# Patient Record
Sex: MALE | Race: BLACK OR AFRICAN AMERICAN | NOT HISPANIC OR LATINO | Employment: FULL TIME | URBAN - METROPOLITAN AREA
[De-identification: names, ages, dates, MRNs, and addresses within clinical notes are randomized per-mention and may not be internally consistent; named-entity substitution may affect disease eponyms.]

---

## 2018-05-07 ENCOUNTER — HOSPITAL ENCOUNTER (EMERGENCY)
Facility: HOSPITAL | Age: 18
Discharge: HOME | End: 2018-05-07
Attending: EMERGENCY MEDICINE
Payer: MEDICAID

## 2018-05-07 VITALS
TEMPERATURE: 98 F | WEIGHT: 140 LBS | SYSTOLIC BLOOD PRESSURE: 152 MMHG | RESPIRATION RATE: 18 BRPM | DIASTOLIC BLOOD PRESSURE: 86 MMHG | OXYGEN SATURATION: 100 % | BODY MASS INDEX: 21.97 KG/M2 | HEART RATE: 110 BPM | HEIGHT: 67 IN

## 2018-05-07 DIAGNOSIS — R06.02 SHORTNESS OF BREATH: ICD-10-CM

## 2018-05-07 DIAGNOSIS — F41.9 ANXIETY: Primary | ICD-10-CM

## 2018-05-07 PROCEDURE — 93005 ELECTROCARDIOGRAM TRACING: CPT

## 2018-05-07 PROCEDURE — 99284 EMERGENCY DEPT VISIT MOD MDM: CPT

## 2018-05-07 PROCEDURE — 25000003 PHARM REV CODE 250: Performed by: NURSE PRACTITIONER

## 2018-05-07 PROCEDURE — 93010 ELECTROCARDIOGRAM REPORT: CPT | Mod: ,,, | Performed by: INTERNAL MEDICINE

## 2018-05-07 RX ORDER — LORAZEPAM 1 MG/1
0.5 TABLET ORAL EVERY 8 HOURS PRN
Qty: 15 TABLET | Refills: 0 | Status: SHIPPED | OUTPATIENT
Start: 2018-05-07

## 2018-05-07 RX ORDER — LORAZEPAM 0.5 MG/1
1 TABLET ORAL
Status: COMPLETED | OUTPATIENT
Start: 2018-05-07 | End: 2018-05-07

## 2018-05-07 RX ADMIN — LORAZEPAM 1 MG: 0.5 TABLET ORAL at 08:05

## 2018-05-08 NOTE — ED PROVIDER NOTES
Encounter Date: 5/7/2018       History     Chief Complaint   Patient presents with    Shortness of Breath     pt presents with c/o SOB x1 day; denies Hx of similar event; denies Hx of asthma or anxiety     The history is provided by the patient. No  was used.   Shortness of Breath   This is a new problem. Duration: Patient presents ambulatory complaining of several episodes of shortness of breath and palpitations beginning this morning that woke him up out of his sleep.  Patient denies history of anxiety or respiratory/cardiac problems. Pertinent negatives include no fever, no headaches, no coryza, no rhinorrhea, no sore throat, no swollen glands, no ear pain, no neck pain, no cough, no sputum production, no hemoptysis, no wheezing, no PND, no orthopnea, no chest pain, no syncope, no vomiting, no abdominal pain, no rash, no leg pain, no leg swelling and no claudication. Precipitated by: Stress. He has tried nothing for the symptoms. Associated medical issues do not include asthma, COPD, pneumonia, chronic lung disease, PE, CAD, heart failure, past MI, DVT or recent surgery.     Review of patient's allergies indicates:   Allergen Reactions    Shrimp Hives     History reviewed. No pertinent past medical history.  Past Surgical History:   Procedure Laterality Date    CIRCUMCISION       History reviewed. No pertinent family history.  Social History   Substance Use Topics    Smoking status: Never Smoker    Smokeless tobacco: Not on file    Alcohol use No     Review of Systems   Constitutional: Negative.  Negative for chills and fever.   HENT: Negative.  Negative for congestion, ear pain, rhinorrhea, sinus pressure and sore throat.    Eyes: Negative.  Negative for pain and discharge.   Respiratory: Positive for shortness of breath. Negative for cough, hemoptysis, sputum production and wheezing.    Cardiovascular: Negative.  Negative for chest pain, orthopnea, claudication, leg swelling, syncope  and PND.   Gastrointestinal: Negative.  Negative for abdominal pain, diarrhea, nausea and vomiting.   Genitourinary: Negative.  Negative for dysuria, genital sores, penile pain, scrotal swelling and testicular pain.   Musculoskeletal: Negative.  Negative for gait problem, joint swelling and neck pain.   Skin: Negative.  Negative for color change and rash.   Allergic/Immunologic: Negative.    Neurological: Negative.  Negative for headaches.   Psychiatric/Behavioral: Negative for behavioral problems, self-injury and suicidal ideas. The patient is not hyperactive.         Stressed/anxious   All other systems reviewed and are negative.      Physical Exam     Initial Vitals [05/07/18 2007]   BP Pulse Resp Temp SpO2   (!) 152/86 110 18 97.9 °F (36.6 °C) 100 %      MAP       108         Physical Exam    Nursing note and vitals reviewed.  Constitutional: He appears well-developed and well-nourished. He is not diaphoretic.  Non-toxic appearance. He does not appear ill. No distress.   HENT:   Head: Normocephalic and atraumatic.   Eyes: Conjunctivae are normal.   Neck: Normal range of motion.   Cardiovascular: Normal rate, regular rhythm, normal heart sounds and intact distal pulses. Exam reveals no gallop and no friction rub.    No murmur heard.  Pulmonary/Chest: Breath sounds normal. No respiratory distress. He has no wheezes. He has no rhonchi. He has no rales. He exhibits no tenderness.   Musculoskeletal: Normal range of motion.   Neurological: He is alert and oriented to person, place, and time.   Skin: Skin is warm and dry. Capillary refill takes less than 2 seconds. No rash noted.   Psychiatric: His behavior is normal. Judgment and thought content normal. His mood appears anxious. His affect is not angry, not blunt, not labile and not inappropriate. His speech is not rapid and/or pressured, not delayed, not tangential and not slurred. He is not agitated, not aggressive, not hyperactive, not slowed, not withdrawn and not  combative. Thought content is not paranoid and not delusional. Cognition and memory are normal. He does not exhibit a depressed mood. He expresses no homicidal and no suicidal ideation. He expresses no suicidal plans and no homicidal plans. He is communicative.   Upon psychiatric assessment, the patient states that he feels an increased sense of stress related to feelings of loneliness and depression.  Patient states that although he knows that his parents both left him, he feels as though they are also concerned with the labs that they sometimes forget about him and his feelings and do not pay attention to him which causes him to feel as if he is all alone.  Patient is a college student at [employer] and reports that he is doing well in school in his grades have not suffered as a result of his feelings.  Patient denies any suicidal ideations or thoughts.         ED Course   Procedures  Labs Reviewed - No data to display  EKG Readings: (Independently Interpreted)   Initial Reading: No STEMI. Rhythm: Normal Sinus Rhythm. Heart Rate: 68. Ectopy: No Ectopy. Conduction: Normal. ST Segments: Normal ST Segments. T Waves: Normal. Axis: Normal. Clinical Impression: Normal Sinus Rhythm     Imaging Results          X-Ray Chest PA And Lateral (Final result)  Result time 05/07/18 20:37:46    Final result by Glenn Zapata MD (05/07/18 20:37:46)                 Impression:      No acute cardiopulmonary finding.      Electronically signed by: Glenn Zapata MD  Date:    05/07/2018  Time:    20:37             Narrative:    EXAMINATION:  XR CHEST PA AND LATERAL    CLINICAL HISTORY:  Shortness of breath    TECHNIQUE:  Frontal and lateral views of the chest were performed.    COMPARISON:  None.    FINDINGS:  Cardiac silhouette is not enlarged. No focal consolidation.  No sizable pleural effusion.  No pneumothorax.  No acute bony abnormality.                                   Medical Decision Making:   Initial  Assessment:   Anxiety  Differential Diagnosis:   Suicidal ideations, cardiac abnormality, respiratory abnormality/shortness of breath  Clinical Tests:   Radiological Study: Ordered and Reviewed  ED Management:  The patient will be given Ativan p.o. in the ER and discharged home on p.r.n. Ativan.  Patient is instructed to follow up with his primary care provider for further evaluation and referral for counseling services.  The patient is instructed to return to the ER as needed if symptoms worsen or fail to improve.  The patient and his mother verbalized understanding of discharge instructions and treatment plan.                      Clinical Impression:   The primary encounter diagnosis was Anxiety. A diagnosis of Shortness of breath was also pertinent to this visit.                           Toussaint Battley III, [facility]  05/07/18 3562

## 2018-05-08 NOTE — ED PROVIDER NOTES
Encounter Date: 5/7/2018       History     Chief Complaint   Patient presents with    Shortness of Breath     pt presents with c/o SOB x1 day; denies Hx of similar event; denies Hx of asthma or anxiety     HPI  Review of patient's allergies indicates:   Allergen Reactions    Shrimp Hives     History reviewed. No pertinent past medical history.  Past Surgical History:   Procedure Laterality Date    CIRCUMCISION       History reviewed. No pertinent family history.  Social History   Substance Use Topics    Smoking status: Never Smoker    Smokeless tobacco: Not on file    Alcohol use No     Review of Systems    Physical Exam     Initial Vitals [05/07/18 2007]   BP Pulse Resp Temp SpO2   (!) 152/86 110 18 97.9 °F (36.6 °C) 100 %      MAP       108         Physical Exam    ED Course   Procedures  Labs Reviewed - No data to display  EKG Readings: (Independently Interpreted)   Rhythm: Normal Sinus Rhythm. Heart Rate: 68. Ectopy: No Ectopy. Conduction: Normal. ST Segments: Normal ST Segments. T Waves: Normal. Clinical Impression: Normal Sinus Rhythm                               Clinical Impression:   The primary encounter diagnosis was Anxiety. A diagnosis of Shortness of breath was also pertinent to this visit.                           Jian Fortune MD  05/11/18 2028       Jian Fortune MD  05/11/18 2029

## 2020-10-19 ENCOUNTER — HOSPITAL ENCOUNTER (EMERGENCY)
Facility: HOSPITAL | Age: 20
Discharge: HOME | End: 2020-10-19
Attending: INTERNAL MEDICINE
Payer: MEDICAID

## 2020-10-19 VITALS
WEIGHT: 149 LBS | SYSTOLIC BLOOD PRESSURE: 142 MMHG | TEMPERATURE: 98 F | RESPIRATION RATE: 18 BRPM | HEART RATE: 60 BPM | DIASTOLIC BLOOD PRESSURE: 73 MMHG | HEIGHT: 67 IN | BODY MASS INDEX: 23.39 KG/M2 | OXYGEN SATURATION: 100 %

## 2020-10-19 DIAGNOSIS — Z20.2 EXPOSURE TO CHLAMYDIA: Primary | ICD-10-CM

## 2020-10-19 PROCEDURE — 63600175 PHARM REV CODE 636 W HCPCS: Mod: ER | Performed by: INTERNAL MEDICINE

## 2020-10-19 PROCEDURE — 87491 CHLMYD TRACH DNA AMP PROBE: CPT

## 2020-10-19 PROCEDURE — 96372 THER/PROPH/DIAG INJ SC/IM: CPT | Mod: ER

## 2020-10-19 PROCEDURE — 25000003 PHARM REV CODE 250: Mod: ER | Performed by: INTERNAL MEDICINE

## 2020-10-19 PROCEDURE — 63700000 PHARM REV CODE 250 ALT 637 W/O HCPCS: Mod: ER | Performed by: INTERNAL MEDICINE

## 2020-10-19 PROCEDURE — 99284 EMERGENCY DEPT VISIT MOD MDM: CPT | Mod: 25,ER

## 2020-10-19 RX ORDER — LIDOCAINE HYDROCHLORIDE 10 MG/ML
5 INJECTION INFILTRATION; PERINEURAL
Status: COMPLETED | OUTPATIENT
Start: 2020-10-19 | End: 2020-10-19

## 2020-10-19 RX ORDER — AZITHROMYCIN 250 MG/1
1000 TABLET, FILM COATED ORAL
Status: COMPLETED | OUTPATIENT
Start: 2020-10-19 | End: 2020-10-19

## 2020-10-19 RX ORDER — CEFTRIAXONE 250 MG/1
250 INJECTION, POWDER, FOR SOLUTION INTRAMUSCULAR; INTRAVENOUS
Status: COMPLETED | OUTPATIENT
Start: 2020-10-19 | End: 2020-10-19

## 2020-10-19 RX ADMIN — AZITHROMYCIN MONOHYDRATE 1000 MG: 250 TABLET ORAL at 03:10

## 2020-10-19 RX ADMIN — LIDOCAINE HYDROCHLORIDE 2 ML: 10 INJECTION, SOLUTION INFILTRATION; PERINEURAL at 03:10

## 2020-10-19 RX ADMIN — CEFTRIAXONE SODIUM 250 MG: 250 INJECTION, POWDER, FOR SOLUTION INTRAMUSCULAR; INTRAVENOUS at 03:10

## 2020-10-19 NOTE — ED PROVIDER NOTES
Encounter Date: 10/19/2020       History     Chief Complaint   Patient presents with    EXPOSURE TO STD     PT REPORTS PARTNER DX WITH CHLAMYDIA. PT REQUESTING STD CHECK, DENIES ANY SYMPTOMS     20-year-old male presents to the emergency department complaining of exposure chlamydia.  States his sexual partner recently tested positive for chlamydia.  He denies penile discharge/fever/chills/nausea/shortness of breath/chest pain.    The history is provided by the patient. No  was used.     Review of patient's allergies indicates:   Allergen Reactions    Shrimp Hives     History reviewed. No pertinent past medical history.  Past Surgical History:   Procedure Laterality Date    CIRCUMCISION       History reviewed. No pertinent family history.  Social History     Tobacco Use    Smoking status: Never Smoker    Smokeless tobacco: Never Used   Substance Use Topics    Alcohol use: Yes     Comment: RARELY    Drug use: No     Review of Systems   Constitutional: Negative for chills and fever.   Respiratory: Negative for cough.    Cardiovascular: Negative for chest pain.   Genitourinary: Negative for discharge.   All other systems reviewed and are negative.      Physical Exam     Initial Vitals [10/19/20 0246]   BP Pulse Resp Temp SpO2   (!) 142/73 60 18 98.1 °F (36.7 °C) 100 %      MAP       --         Physical Exam    Nursing note and vitals reviewed.  Constitutional: He appears well-developed and well-nourished. No distress.   HENT:   Head: Normocephalic and atraumatic.   Right Ear: External ear normal.   Left Ear: External ear normal.   Eyes: Conjunctivae are normal.   Neck: Normal range of motion. Neck supple.   Cardiovascular: Normal rate and regular rhythm.   Pulmonary/Chest: Breath sounds normal. No respiratory distress.   Abdominal: Soft. Bowel sounds are normal.   Genitourinary:    Testes and penis normal.   Circumcised. No phimosis, paraphimosis, hypospadias, penile erythema or penile  tenderness. No discharge found.   Musculoskeletal: Normal range of motion.   Neurological: He is alert. He has normal strength.   Skin: Skin is warm and dry. Capillary refill takes less than 2 seconds.   Psychiatric: He has a normal mood and affect. Thought content normal.         ED Course   Procedures  Labs Reviewed   C. TRACHOMATIS/N. GONORRHOEAE BY AMP DNA          Imaging Results    None          Medical Decision Making:   Initial Assessment:   20-year-old male presents to the emergency department complaining of exposure chlamydia.  States his sexual partner recently tested positive for chlamydia.  He denies penile discharge/fever/chills/nausea/shortness of breath/chest pain.  ED Management:  Urine was sent for GC/chlamydia screen.  Patient was given Zithromax/Rocephin in the emergency department and received instructions for chlamydia exposure.  He was advised to follow up with his primary care physician within the next week for re-evaluation/return to the emergency department if condition worsens.                             Clinical Impression:     ICD-10-CM ICD-9-CM   1. Exposure to chlamydia  Z20.2 V01.6                          ED Disposition Condition    Discharge Stable        ED Prescriptions     None        Follow-up Information     Follow up With Specialties Details Why Contact Info    BRIAN Worley III, MD Orthopedic Surgery Schedule an appointment as soon as possible for a visit in 1 day For reevaluation [address] [id number] [phone]                                         Jose R Kwong MD  10/19/20 8449

## 2020-11-14 ENCOUNTER — HOSPITAL ENCOUNTER (EMERGENCY)
Facility: HOSPITAL | Age: 20
Discharge: HOME | End: 2020-11-14
Attending: EMERGENCY MEDICINE
Payer: MEDICAID

## 2020-11-14 VITALS
TEMPERATURE: 98 F | OXYGEN SATURATION: 100 % | HEART RATE: 62 BPM | DIASTOLIC BLOOD PRESSURE: 72 MMHG | SYSTOLIC BLOOD PRESSURE: 121 MMHG | RESPIRATION RATE: 18 BRPM

## 2020-11-14 DIAGNOSIS — K59.00 CONSTIPATION, UNSPECIFIED CONSTIPATION TYPE: ICD-10-CM

## 2020-11-14 DIAGNOSIS — K60.2 ANAL FISSURE: Primary | ICD-10-CM

## 2020-11-14 LAB
BILIRUBIN, POC UA: NEGATIVE
BLOOD, POC UA: NEGATIVE
CLARITY, POC UA: CLEAR
COLOR, POC UA: YELLOW
GLUCOSE, POC UA: NEGATIVE
KETONES, POC UA: NEGATIVE
LEUKOCYTE EST, POC UA: NEGATIVE
NITRITE, POC UA: NEGATIVE
PH UR STRIP: 7 [PH]
PROTEIN, POC UA: NEGATIVE
SPECIFIC GRAVITY, POC UA: >=1.03
UROBILINOGEN, POC UA: 1 E.U./DL

## 2020-11-14 PROCEDURE — 81003 URINALYSIS AUTO W/O SCOPE: CPT | Mod: ER

## 2020-11-14 PROCEDURE — 99284 EMERGENCY DEPT VISIT MOD MDM: CPT | Mod: ER

## 2020-11-14 RX ORDER — HYDROCORTISONE ACETATE PRAMOXINE HCL 2.5; 1 G/100G; G/100G
CREAM TOPICAL 3 TIMES DAILY
Qty: 30 G | Refills: 0 | Status: SHIPPED | OUTPATIENT
Start: 2020-11-14 | End: 2020-11-24

## 2020-11-14 RX ORDER — HYDROCORTISONE 25 MG/G
CREAM TOPICAL
Status: DISCONTINUED | OUTPATIENT
Start: 2020-11-14 | End: 2020-11-14 | Stop reason: HOSPADM

## 2020-11-14 RX ORDER — DOCUSATE SODIUM 100 MG/1
100 CAPSULE, LIQUID FILLED ORAL 2 TIMES DAILY
Qty: 20 CAPSULE | Refills: 0 | Status: SHIPPED | OUTPATIENT
Start: 2020-11-14 | End: 2020-11-24

## 2020-11-14 RX ORDER — HYDROXYZINE PAMOATE 50 MG/1
50 CAPSULE ORAL EVERY 6 HOURS PRN
Qty: 20 CAPSULE | Refills: 0 | Status: SHIPPED | OUTPATIENT
Start: 2020-11-14 | End: 2020-11-19

## 2020-11-14 NOTE — ED PROVIDER NOTES
"Encounter Date: 11/14/2020    SCRIBE #1 NOTE: I, Afia Soto, am scribing for, and in the presence of,  SUNDAY Burns. I have scribed the following portions of the note - Other sections scribed: HPI, ROS, PE.       History     Chief Complaint   Patient presents with    Hemorrhoids     Pt states," I have an issue with my butt. I have anal itching and after I pee it agosto."     Forrest Elizabeth is a 20 y.o. male who presents to the ED complaining of intermittent anal itching and discomfort with urination after being treated for exposure to chlamydia on 10/19/20. Reports that he is usually constipated, with only 2 bowel movements per week. Anal itching occurs after having bowel movements and lasts for 5-10 minutes following.  Denies fever, chills, nausea, vomiting, diarrhea, rectal pain, rectal bleeding, abdominal pain, dysuria, hematuria, frequency, urgency, penile discharge, or testicular pain.    The history is provided by the patient. No  was used.     Review of patient's allergies indicates:   Allergen Reactions    Shrimp Hives     History reviewed. No pertinent past medical history.  Past Surgical History:   Procedure Laterality Date    CIRCUMCISION       History reviewed. No pertinent family history.  Social History     Tobacco Use    Smoking status: Never Smoker    Smokeless tobacco: Never Used   Substance Use Topics    Alcohol use: Yes     Comment: RARELY    Drug use: No     Review of Systems   Constitutional: Negative for chills and fever.   HENT: Negative for sore throat.    Eyes: Negative for redness.   Respiratory: Negative for shortness of breath.    Cardiovascular: Negative for chest pain.   Gastrointestinal: Positive for constipation. Negative for abdominal pain, anal bleeding, diarrhea, nausea, rectal pain and vomiting.        + Anal itching   Genitourinary: Negative for difficulty urinating, discharge, dysuria, frequency, hematuria, testicular pain and urgency.        + " discomfort with urination   Musculoskeletal: Negative for back pain.   Skin: Negative for rash.       Physical Exam     Initial Vitals [11/14/20 1050]   BP Pulse Resp Temp SpO2   137/77 70 16 98 °F (36.7 °C) 99 %      MAP       --         Physical Exam    Nursing note and vitals reviewed.  Constitutional: He appears well-developed and well-nourished.   HENT:   Head: Normocephalic and atraumatic.   Eyes: Conjunctivae are normal.   Neck: Normal range of motion. Neck supple.   Cardiovascular: Normal rate and intact distal pulses.   Pulmonary/Chest: No respiratory distress.   Abdominal: Soft. There is no abdominal tenderness.   Genitourinary: Rectum:      No tenderness or external hemorrhoid.      Genitourinary Comments: Chaperoned by Afia Soto scribe:   No perirectal swelling or erythema. Small anal fissure at 6 oclock. No purulent drainage        Musculoskeletal: Normal range of motion.   Neurological: He is alert and oriented to person, place, and time.   Skin: Skin is warm and dry.   Psychiatric: He has a normal mood and affect. His behavior is normal.         ED Course   Procedures  Labs Reviewed   POCT URINALYSIS W/O SCOPE - Abnormal; Notable for the following components:       Result Value    Spec Grav UA >=1.030 (*)     All other components within normal limits   POCT URINALYSIS W/O SCOPE          Imaging Results    None          Medical Decision Making:   History:   Old Medical Records: I decided to obtain old medical records.  Clinical Tests:   Lab Tests: Ordered and Reviewed  ED Management:  20-year-old male presenting for intermittent anal itching that occurs after bowel movements.  He reports he does have chronic constipation.  Only 2 bowel movements per week.  Symptoms occur after having a bowel movement.  Denies any fever, chills, drainage, rectal bleeding or pain.  Exam above.  Patient is afebrile, nontoxic appearing distress.  No external hemorrhoid.  No evidence of perirectal or perianal abscess.   Think this is likely due to anal fissure.  Will discharge with hydrocortisone cream and barrier cream as well as Colace stool softener.  Instructed on high-fiber diet and foods and things to avoid to prevent itching.  He states he does use scented wipes which she has been using for while.  Instructed to discontinue use in case there is irritation from use. He reports having discomfort with urination. UA is negative for infection. No penile discharge, pain or swelling or testicular pain or swelling. Will have him follow up with PCP. Already treated for GC last month. FOllow up with primary care in 2 days and colorectal surgery. Return to ER for worsening symptoms or as needed.             Scribe Attestation:   Scribe #1: I performed the above scribed service and the documentation accurately describes the services I performed. I attest to the accuracy of the note.    Attending Attestation:           Physician Attestation for Scribe:  Physician Attestation Statement for Scribe #1: I, Sirena Villagran PA-C, reviewed documentation, as scribed by Afia Soto  in my presence, and it is both accurate and complete.                            Clinical Impression:     ICD-10-CM ICD-9-CM   1. Anal fissure  K60.2 565.0   2. Constipation, unspecified constipation type  K59.00 564.00                          ED Disposition Condition    Discharge Stable        ED Prescriptions     Medication Sig Dispense Start Date End Date Auth. Provider    hydrocortisone-pramoxine (ANALPRAM-HC) 2.5-1 % Crea Place rectally 3 (three) times daily. for 10 days 30 g 11/14/2020 11/24/2020 Sirena Villagran PA-C    zinc oxide 10 % Crea Apply 50 g topically as needed. 50 g 11/14/2020 11/28/2020 Sirena Villagran PA-C    docusate sodium (COLACE) 100 MG capsule Take 1 capsule (100 mg total) by mouth 2 (two) times daily. for 10 days 20 capsule 11/14/2020 11/24/2020 Sirena Villagran PA-C    hydrOXYzine pamoate (VISTARIL) 50 MG Cap Take 1 capsule  (50 mg total) by mouth every 6 (six) hours as needed (itching). 20 capsule 11/14/2020 11/19/2020 Sirena Villagran PA-C        Follow-up Information     Follow up With Specialties Details Why Contact Info    [facility]  Schedule an appointment as soon as possible for a visit   [address]  [address]  [place] [address]  [phone]      [facility]  Schedule an appointment as soon as possible for a visit   [address]  [place] [facility] [address]  [phone]      [facility] COLON & RECTAL SURGERY Colon and Rectal Surgery   [address] [address]  [phone]    [facility]  Schedule an appointment as soon as possible for a visit in 2 days for follow up Please call [phone] to schedule follow up with [facility].    Approved Provider By Your Insurance Company  Schedule an appointment as soon as possible for a visit in 2 days Call number on your insurance card to receive a full list of providers in your area     [facility] Emergency Medicine Go to  As needed, If symptoms worsen [address] [facility] [address] [phone]                                       Sirena Villagran PA-C  11/14/20 1850

## 2020-12-13 ENCOUNTER — HOSPITAL ENCOUNTER (EMERGENCY)
Facility: HOSPITAL | Age: 20
Discharge: HOME | End: 2020-12-13
Attending: INTERNAL MEDICINE
Payer: MEDICAID

## 2020-12-13 VITALS
DIASTOLIC BLOOD PRESSURE: 85 MMHG | SYSTOLIC BLOOD PRESSURE: 124 MMHG | HEIGHT: 68 IN | RESPIRATION RATE: 18 BRPM | WEIGHT: 140 LBS | BODY MASS INDEX: 21.22 KG/M2 | HEART RATE: 76 BPM | TEMPERATURE: 98 F | OXYGEN SATURATION: 99 %

## 2020-12-13 DIAGNOSIS — Z20.2 POTENTIAL EXPOSURE TO STD: Primary | ICD-10-CM

## 2020-12-13 PROCEDURE — 99282 EMERGENCY DEPT VISIT SF MDM: CPT | Mod: ER

## 2020-12-13 NOTE — DISCHARGE INSTRUCTIONS
Follow-up with [facility] within the next 3 days  Address: [address], [place] [address]   Hours: Closed ? Opens 8AM Wed   Phone: [phone]

## 2020-12-13 NOTE — ED PROVIDER NOTES
Encounter Date: 12/13/2020    SCRIBE #1 NOTE: I, Seema Gonzalez, am scribing for, and in the presence of,  Dr. Kwong. I have scribed the following portions of the note - Other sections scribed: HPI, ROS, PE.       History     Chief Complaint   Patient presents with    EXPOSURE TO STD     PT REPORTS PARTNER REPORTED POSSIBLE CHLAMYDIA, PT DINIES ANY SYMPTOMS BUT REQUESTING TX     Forrest Elizabeth is a 20 y.o. male who presents to the ED with concern of possible exposure to chlamydia. The patient's female sexual partner tested positive for chlamydia. Both she and the patient were subsequently treated for chlamydia, but the partner thinks that she still has it, so the patient is also requesting treatment again. Patient denies any previous or present symptoms.    The history is provided by the patient. No  was used.     Review of patient's allergies indicates:   Allergen Reactions    Shrimp Hives     History reviewed. No pertinent past medical history.  Past Surgical History:   Procedure Laterality Date    CIRCUMCISION       History reviewed. No pertinent family history.  Social History     Tobacco Use    Smoking status: Never Smoker    Smokeless tobacco: Never Used   Substance Use Topics    Alcohol use: Yes     Comment: RARELY    Drug use: No     Review of Systems   Constitutional: Negative for fever.   HENT: Negative for sore throat.    Respiratory: Negative for shortness of breath.    Cardiovascular: Negative for chest pain.   Gastrointestinal: Negative for diarrhea, nausea and vomiting.   Genitourinary: Negative for dysuria.   Musculoskeletal: Negative for back pain.   Skin: Negative for rash.   Neurological: Negative for weakness and headaches.   Psychiatric/Behavioral: Negative for behavioral problems.   All other systems reviewed and are negative.      Physical Exam     Initial Vitals [12/13/20 0015]   BP Pulse Resp Temp SpO2   124/85 76 18 98.2 °F (36.8 °C) 99 %      MAP       --          Physical Exam    Nursing note and vitals reviewed.  Constitutional: He appears well-developed and well-nourished.   HENT:   Head: Normocephalic and atraumatic.   Eyes: Conjunctivae are normal.   Neck: Normal range of motion. Neck supple.   Cardiovascular: Normal rate, regular rhythm and normal heart sounds. Exam reveals no gallop and no friction rub.    No murmur heard.  Pulmonary/Chest: Breath sounds normal. No respiratory distress. He has no wheezes. He has no rhonchi. He has no rales.   Abdominal: Soft. There is no abdominal tenderness.   Musculoskeletal: Normal range of motion. No edema.   Neurological: He is alert and oriented to person, place, and time. GCS score is 15. GCS eye subscore is 4. GCS verbal subscore is 5. GCS motor subscore is 6.   Skin: Skin is warm and dry.   Psychiatric: He has a normal mood and affect.         ED Course   Procedures  Labs Reviewed - No data to display       Imaging Results    None          Medical Decision Making:   History:   Old Medical Records: I decided to obtain old medical records.  Initial Assessment:   Forrest Elizabeth is a 20 y.o. male who presents to the ED with concern of possible exposure to chlamydia. The patient's female sexual partner tested positive for chlamydia. Both she and the patient were subsequently treated for chlamydia, but the partner thinks that she still has it, so the patient is also requesting treatment again. Patient denies any previous or present symptoms.  ED Management:  Patient exhibits no symptoms of sexually transmitted disease and no evidence of contact with a chlamydia infected sexual partner.  He was advised follow up with the [facility] for routine screening of sexually transmitted diseases.            Scribe Attestation:   Scribe #1: I performed the above scribed service and the documentation accurately describes the services I performed. I attest to the accuracy of the note.    This document was produced by a zayibe  under my direction and in my presence. I agree with the content of the note and have made any necessary edits.     Dr. Kwong    12/13/2020 2:53 AM                    Clinical Impression:     ICD-10-CM ICD-9-CM   1. Potential exposure to STD  Z20.2 V01.6                          ED Disposition Condition    Discharge Stable        ED Prescriptions     None        Follow-up Information    None                                      Jose R Kwong MD  12/13/20 0254

## 2021-01-20 ENCOUNTER — HOSPITAL ENCOUNTER (EMERGENCY)
Facility: HOSPITAL | Age: 21
Discharge: HOME | End: 2021-01-21
Attending: EMERGENCY MEDICINE
Payer: MEDICAID

## 2021-01-20 VITALS
OXYGEN SATURATION: 99 % | BODY MASS INDEX: 21.98 KG/M2 | TEMPERATURE: 99 F | RESPIRATION RATE: 20 BRPM | WEIGHT: 145 LBS | HEIGHT: 68 IN | SYSTOLIC BLOOD PRESSURE: 131 MMHG | HEART RATE: 99 BPM | DIASTOLIC BLOOD PRESSURE: 86 MMHG

## 2021-01-20 DIAGNOSIS — B34.9 VIRAL ILLNESS: Primary | ICD-10-CM

## 2021-01-20 PROCEDURE — 99282 EMERGENCY DEPT VISIT SF MDM: CPT | Mod: 25,ER

## 2021-01-21 LAB — POC RAPID STREP A: NEGATIVE

## 2021-04-15 ENCOUNTER — PATIENT MESSAGE (OUTPATIENT)
Dept: RESEARCH | Facility: HOSPITAL | Age: 21
End: 2021-04-15

## 2021-11-25 ENCOUNTER — HOSPITAL ENCOUNTER (EMERGENCY)
Facility: HOSPITAL | Age: 21
Discharge: HOME | End: 2021-11-25
Attending: EMERGENCY MEDICINE
Payer: MEDICAID

## 2021-11-25 VITALS
TEMPERATURE: 98 F | RESPIRATION RATE: 16 BRPM | HEART RATE: 74 BPM | DIASTOLIC BLOOD PRESSURE: 64 MMHG | OXYGEN SATURATION: 98 % | BODY MASS INDEX: 21.22 KG/M2 | HEIGHT: 68 IN | WEIGHT: 140 LBS | SYSTOLIC BLOOD PRESSURE: 118 MMHG

## 2021-11-25 DIAGNOSIS — M25.561 RIGHT KNEE PAIN: ICD-10-CM

## 2021-11-25 PROCEDURE — 99284 EMERGENCY DEPT VISIT MOD MDM: CPT | Mod: ER

## 2021-11-25 RX ORDER — LIDOCAINE HYDROCHLORIDE AND EPINEPHRINE 10; 10 MG/ML; UG/ML
1 INJECTION, SOLUTION INFILTRATION; PERINEURAL
Status: DISCONTINUED | OUTPATIENT
Start: 2021-11-25 | End: 2021-11-25

## 2021-11-25 RX ORDER — KETOROLAC TROMETHAMINE 10 MG/1
10 TABLET, FILM COATED ORAL EVERY 6 HOURS PRN
Qty: 12 TABLET | Refills: 0 | Status: SHIPPED | OUTPATIENT
Start: 2021-11-25 | End: 2021-11-28

## 2021-11-25 RX ORDER — ONDANSETRON 2 MG/ML
8 INJECTION INTRAMUSCULAR; INTRAVENOUS
Status: DISCONTINUED | OUTPATIENT
Start: 2021-11-25 | End: 2021-11-25

## 2021-11-25 RX ORDER — DICLOFENAC SODIUM 10 MG/G
GEL TOPICAL
Qty: 100 G | Refills: 0 | Status: SHIPPED | OUTPATIENT
Start: 2021-11-25

## 2024-02-06 ENCOUNTER — HOSPITAL ENCOUNTER (EMERGENCY)
Facility: HOSPITAL | Age: 24
Discharge: HOME | End: 2024-02-06
Attending: EMERGENCY MEDICINE
Payer: MEDICAID

## 2024-02-06 VITALS
SYSTOLIC BLOOD PRESSURE: 140 MMHG | HEART RATE: 75 BPM | HEIGHT: 68 IN | TEMPERATURE: 98 F | DIASTOLIC BLOOD PRESSURE: 85 MMHG | BODY MASS INDEX: 21.98 KG/M2 | WEIGHT: 145 LBS | RESPIRATION RATE: 19 BRPM | OXYGEN SATURATION: 100 %

## 2024-02-06 DIAGNOSIS — R30.0 DYSURIA: Primary | ICD-10-CM

## 2024-02-06 DIAGNOSIS — Z20.2 POSSIBLE EXPOSURE TO STD: ICD-10-CM

## 2024-02-06 LAB
BILIRUBIN, POC UA: NEGATIVE
BLOOD, POC UA: NEGATIVE
CLARITY, POC UA: CLEAR
COLOR, POC UA: YELLOW
GLUCOSE, POC UA: NEGATIVE
KETONES, POC UA: NEGATIVE
LEUKOCYTE EST, POC UA: NEGATIVE
NITRITE, POC UA: NEGATIVE
PH UR STRIP: 7 [PH]
PROTEIN, POC UA: NEGATIVE
SPECIFIC GRAVITY, POC UA: 1.02
UROBILINOGEN, POC UA: 1 E.U./DL

## 2024-02-06 PROCEDURE — 63600175 PHARM REV CODE 636 W HCPCS: Mod: ER | Performed by: EMERGENCY MEDICINE

## 2024-02-06 PROCEDURE — 87491 CHLMYD TRACH DNA AMP PROBE: CPT | Performed by: EMERGENCY MEDICINE

## 2024-02-06 PROCEDURE — 99284 EMERGENCY DEPT VISIT MOD MDM: CPT | Mod: 25,ER

## 2024-02-06 PROCEDURE — 96372 THER/PROPH/DIAG INJ SC/IM: CPT | Performed by: EMERGENCY MEDICINE

## 2024-02-06 RX ORDER — CEFTRIAXONE 500 MG/1
500 INJECTION, POWDER, FOR SOLUTION INTRAMUSCULAR; INTRAVENOUS
Status: COMPLETED | OUTPATIENT
Start: 2024-02-06 | End: 2024-02-06

## 2024-02-06 RX ORDER — DOXYCYCLINE 100 MG/1
100 CAPSULE ORAL EVERY 12 HOURS
Qty: 14 CAPSULE | Refills: 0 | Status: SHIPPED | OUTPATIENT
Start: 2024-02-06 | End: 2024-02-13

## 2024-02-06 RX ADMIN — CEFTRIAXONE SODIUM 500 MG: 500 INJECTION, POWDER, FOR SOLUTION INTRAMUSCULAR; INTRAVENOUS at 09:02

## 2024-02-07 NOTE — ED PROVIDER NOTES
"Encounter Date: 2/6/2024       History     Chief Complaint   Patient presents with    Urinary Tract Infection     Reports "uncomfortable feeling" at end of urination for 2-3 days, denies burning/fever or discharge     23 y.o. male with no known medical problems presents to the emergency department complaining of acute terminal dysuria and irritation at the tip of his penis that began three days ago.  He reports unprotected sex with a new partner prior to the onset of symptoms.  He denies penile discharge, penile swelling, testicular pain/swelling, genital sores, rash, abdominal pain, hematuria, oliguria or frequency, hesitancy or tenesmus.    He denies prior history of STD.  Circumcised    The history is provided by the patient.     Review of patient's allergies indicates:   Allergen Reactions    Shellfish containing products Swelling    Shrimp Hives     No past medical history on file.  Past Surgical History:   Procedure Laterality Date    CIRCUMCISION       No family history on file.  Social History     Tobacco Use    Smoking status: Never    Smokeless tobacco: Never   Substance Use Topics    Alcohol use: Yes     Comment: RARELY    Drug use: No     Review of Systems   Constitutional:  Negative for fever.   Gastrointestinal:  Negative for abdominal pain, nausea and vomiting.   Genitourinary:  Positive for dysuria. Negative for decreased urine volume, difficulty urinating, flank pain, frequency, genital sores, hematuria, penile discharge, penile pain, penile swelling, scrotal swelling, testicular pain and urgency.   Skin:  Negative for rash.       Physical Exam     Initial Vitals [02/06/24 1848]   BP Pulse Resp Temp SpO2   (!) 140/85 75 19 98.1 °F (36.7 °C) 100 %      MAP       --         Physical Exam    Nursing note and vitals reviewed.  Constitutional: He appears well-developed and well-nourished. He is not diaphoretic. No distress.   HENT:   Head: Normocephalic and atraumatic.   Mouth/Throat: Oropharynx is clear " and moist.   Eyes: Conjunctivae are normal.   Neck: Phonation normal. No stridor present.   Normal range of motion.  Cardiovascular:  Regular rhythm and intact distal pulses.           Pulmonary/Chest: Effort normal. No accessory muscle usage or stridor. No tachypnea. No respiratory distress.   Abdominal: He exhibits no distension. There is no abdominal tenderness.   Genitourinary:    Genitourinary Comments:  exam deferred       Musculoskeletal:         General: No tenderness. Normal range of motion.      Cervical back: Normal range of motion.     Neurological: He is alert and oriented to person, place, and time. He has normal strength. Gait normal. GCS eye subscore is 4. GCS verbal subscore is 5. GCS motor subscore is 6.   Skin: Skin is warm and intact.   Psychiatric: He has a normal mood and affect.         ED Course   Procedures  Labs Reviewed   C. TRACHOMATIS/N. GONORRHOEAE BY AMP DNA   POCT URINALYSIS W/O SCOPE          Imaging Results    None          Medications   cefTRIAXone injection 500 mg (500 mg Intramuscular Given 2/6/24 2101)     Medical Decision Making  Amount and/or Complexity of Data Reviewed  Labs: ordered.    Risk  Prescription drug management.                          Medical Decision Making:   Differential Diagnosis:   gonorrhea, chlamydia, trichomonas, syphilis, HIV and others    Clinical Tests:   Lab Tests: Ordered and Reviewed  ED Management:  UA negative. Urine GC chlamydia pending at time of ED disposition.  Test results, outpatient management plan, outpatient PCP follow-up and ED return precautions discussed with patient with understanding and agreement.             Clinical Impression:  Final diagnoses:  [Z20.2] Possible exposure to STD  [R30.0] Dysuria (Primary)          ED Disposition Condition    Discharge Stable          ED Prescriptions       Medication Sig Dispense Start Date End Date Auth. Provider    doxycycline (MONODOX) 100 MG capsule Take 1 capsule (100 mg total) by mouth  every 12 (twelve) hours. for 7 days 14 capsule 2/6/2024 2/13/2024 Shea Mackay MD          Follow-up Information       Follow up With Specialties Details Why Contact Info    The nearest emergency department.  Go to  As needed, If symptoms worsen     Your PCP  Call in 1 day to schedule an appointment, for re-evaluation of today's complaint, to request comprehensive STD testing and treatment as indicated              Shea Mackay MD  02/08/24 0169

## 2024-02-14 LAB
C TRACH DNA SPEC QL NAA+PROBE: NOT DETECTED
N GONORRHOEA DNA SPEC QL NAA+PROBE: NOT DETECTED

## 2025-06-23 ENCOUNTER — HOSPITAL ENCOUNTER (EMERGENCY)
Facility: HOSPITAL | Age: 25
Discharge: HOME | End: 2025-06-23
Attending: EMERGENCY MEDICINE

## 2025-06-23 VITALS
RESPIRATION RATE: 20 BRPM | TEMPERATURE: 98 F | HEIGHT: 68 IN | SYSTOLIC BLOOD PRESSURE: 143 MMHG | WEIGHT: 155 LBS | DIASTOLIC BLOOD PRESSURE: 87 MMHG | HEART RATE: 60 BPM | OXYGEN SATURATION: 100 % | BODY MASS INDEX: 23.49 KG/M2

## 2025-06-23 DIAGNOSIS — F41.9 ANXIETY: Primary | ICD-10-CM

## 2025-06-23 LAB
CTP QC/QA: YES
INFLUENZA A ANTIGEN, POC: NEGATIVE
INFLUENZA B ANTIGEN, POC: NEGATIVE
POC RAPID STREP A: NEGATIVE
SARS-COV-2 RDRP RESP QL NAA+PROBE: NEGATIVE

## 2025-06-23 PROCEDURE — 25000003 PHARM REV CODE 250: Mod: ER | Performed by: NURSE PRACTITIONER

## 2025-06-23 PROCEDURE — 99284 EMERGENCY DEPT VISIT MOD MDM: CPT | Mod: ER

## 2025-06-23 PROCEDURE — 87804 INFLUENZA ASSAY W/OPTIC: CPT | Mod: ER

## 2025-06-23 PROCEDURE — 87635 SARS-COV-2 COVID-19 AMP PRB: CPT | Mod: ER | Performed by: NURSE PRACTITIONER

## 2025-06-23 PROCEDURE — 87880 STREP A ASSAY W/OPTIC: CPT | Mod: ER

## 2025-06-23 RX ORDER — HYDROXYZINE HYDROCHLORIDE 25 MG/1
25 TABLET, FILM COATED ORAL 3 TIMES DAILY PRN
Qty: 12 TABLET | Refills: 0 | Status: SHIPPED | OUTPATIENT
Start: 2025-06-23

## 2025-06-23 RX ORDER — ONDANSETRON 4 MG/1
4 TABLET, ORALLY DISINTEGRATING ORAL
Status: COMPLETED | OUTPATIENT
Start: 2025-06-23 | End: 2025-06-23

## 2025-06-23 RX ORDER — ONDANSETRON 4 MG/1
4 TABLET, ORALLY DISINTEGRATING ORAL EVERY 6 HOURS PRN
Qty: 10 TABLET | Refills: 0 | Status: SHIPPED | OUTPATIENT
Start: 2025-06-23

## 2025-06-23 RX ORDER — HYDROXYZINE PAMOATE 25 MG/1
25 CAPSULE ORAL
Status: COMPLETED | OUTPATIENT
Start: 2025-06-23 | End: 2025-06-23

## 2025-06-23 RX ADMIN — HYDROXYZINE PAMOATE 25 MG: 25 CAPSULE ORAL at 01:06

## 2025-06-23 RX ADMIN — ONDANSETRON 4 MG: 4 TABLET, ORALLY DISINTEGRATING ORAL at 01:06

## 2025-06-23 NOTE — ED PROVIDER NOTES
Encounter Date: 6/23/2025    SCRIBE #1 NOTE: I, Rubi Sun, am scribing for, and in the presence of,  Marily Allred NP. I have scribed the following portions of the note - Other sections scribed: HPI, ROS.       History     Chief Complaint   Patient presents with    Anxiety     Patient reports feeling anxious and losing voice onset Saturday night.  Reports PMH of anxiety and taking medication for anxiety in the past, not currently taking medication for anxiety. Denies suicidal and/or homicidal ideations.      CC: Anxiety     HPI: Forrest Elizabeth is a 25 y.o. male, with a past medical history of anxiety, who presents to the ED with anxiety that began 3 days ago. Patient states he went to a pool party on Saturday and he began to feel anxious the next morning. He states he has struggled with anxiety in the past, but is usually able to resolve the feeling with breath exercises. He notes he has prescribed Ativan in the past. Patient also reports hoarseness, subjective fevers and chills, vomiting (1x last night). Patient states he has not been eating or hydrating well. Denies attempting treatment PTA. Denies known sick contact. Patient denies sore throat, rhinorrhea, cough, congestion, SI, HI, or other associated symptoms. Patient admits to vaping and hookah. He admits to EtOH usage (weekends), notes he drank heavily on Saturday and some yesterday. NDKA.     The history is provided by the patient. No  was used.     Review of patient's allergies indicates:   Allergen Reactions    Shellfish containing products Swelling    Shrimp Hives     No past medical history on file.  Past Surgical History:   Procedure Laterality Date    CIRCUMCISION       No family history on file.  Social History[1]  Review of Systems   Constitutional:  Positive for chills (subjective) and fever (subjective). Negative for diaphoresis.   HENT:  Positive for voice change (hoarseness). Negative for congestion, facial swelling,  rhinorrhea and sore throat.    Eyes:  Negative for visual disturbance.   Respiratory:  Negative for cough and shortness of breath.    Cardiovascular:  Negative for chest pain.   Gastrointestinal:  Positive for vomiting (1x). Negative for abdominal pain and nausea.   Genitourinary:  Negative for dysuria and hematuria.   Musculoskeletal:  Negative for myalgias.   Skin:  Negative for rash.   Neurological:  Negative for headaches.   Psychiatric/Behavioral:  Negative for agitation and suicidal ideas. The patient is nervous/anxious.        Physical Exam     Initial Vitals [06/23/25 1214]   BP Pulse Resp Temp SpO2   (!) 142/87 66 18 97.8 °F (36.6 °C) 98 %      MAP       --         Physical Exam    Vitals reviewed.  Constitutional: He appears well-developed and well-nourished. He is not diaphoretic.  Non-toxic appearance. He does not have a sickly appearance. He does not appear ill. No distress.   HENT:   Head: Normocephalic and atraumatic.   Right Ear: External ear normal.   Left Ear: External ear normal.   Nose: Nose normal. Mouth/Throat: Uvula is midline and mucous membranes are normal. Posterior oropharyngeal erythema present. No oropharyngeal exudate.   Eyes: Conjunctivae and EOM are normal. Pupils are equal, round, and reactive to light.   Neck:   Normal range of motion.  Cardiovascular:  Normal rate, regular rhythm, normal heart sounds and intact distal pulses.           Pulmonary/Chest: Breath sounds normal. No respiratory distress.   Abdominal: Abdomen is soft. Bowel sounds are normal. There is no abdominal tenderness.   Musculoskeletal:         General: Normal range of motion.      Cervical back: Normal range of motion.     Neurological: He is alert and oriented to person, place, and time. GCS eye subscore is 4. GCS verbal subscore is 5. GCS motor subscore is 6.   Skin: Skin is warm, dry and intact. No rash noted. No erythema.   Psychiatric: He has a normal mood and affect. Thought content normal.         ED  Course   Procedures  Labs Reviewed   SARS-COV-2 RDRP GENE       Result Value    POC Rapid COVID Negative       Acceptable Yes      Narrative:     This test utilizes isothermal nucleic acid amplification technology to detect the SARS-CoV-2 RdRp nucleic acid segment. The analytical sensitivity (limit of detection) is 500 copies/swab.     A POSITIVE result is indicative of the presence of SARS-CoV-2 RNA; clinical correlation with patient history and other diagnostic information is necessary to determine patient infection status.    A NEGATIVE result means that SARS-CoV-2 nucleic acids are not present above the limit of detection. A NEGATIVE result should be treated as presumptive. It does not rule out the possibility of COVID-19 and should not be the sole basis for treatment decisions. If COVID-19 is strongly suspected based on clinical and exposure history, re-testing using an alternate molecular assay should be considered.     Commercial kits are provided by [employer].       POCT INFLUENZA A/B MOLECULAR   POCT STREP A MOLECULAR   POCT STREP A, RAPID    POC Rapid Strep A negative     POCT RAPID INFLUENZA A/B    Influenza B Ag negative      Inflenza A Ag negative            Imaging Results    None          Medications   ondansetron disintegrating tablet 4 mg (4 mg Oral Given 6/23/25 1349)   hydrOXYzine pamoate capsule 25 mg (25 mg Oral Given 6/23/25 1349)     Medical Decision Making  25-year-old male presenting to the ED for evaluation of anxiety, sore throat, nausea, vomiting after drinking alcohol on Saturday.  Differentials include anxiety, viral URI, strep pharyngitis, gastritis, GERD, others.  Patient is pleasant and well-appearing.  Vital signs stable.  Ears and throat without infection.  Breath sounds clear.  Abdominal exam is benign without tenderness, guarding, or rigidity.  No CVA tenderness.  Denies chest pain or shortness of breath.  Patient declined IV, labs, fluids; requesting  treatment with oral medications.  Given oral medications and monitored.  Reports feeling improved.  COVID, flu, strep negative.  Will discharge home with supportive care.  Return precautions discussed with the patient who verbalized understanding.  He is agreeable.    Amount and/or Complexity of Data Reviewed  Labs: ordered. Decision-making details documented in ED Course.    Risk  Prescription drug management.            Scribe Attestation:   Scribe #1: I performed the above scribed service and the documentation accurately describes the services I performed. I attest to the accuracy of the note.        ED Course as of 06/23/25 1601   Mon Jun 23, 2025   1412 Influenza B Ag: negative [MM]   1413 Inflenza A Ag: negative [MM]   1413 SARS-CoV-2 RNA, Amplification, Qual: Negative [MM]      ED Course User Index  [MM] Marily Allred NP I, M Mercer, personally performed the services described in this documentation. All medical record entries made by the scribe were at my direction and in my presence. I have reviewed the chart and agree that the record reflects my personal performance and is accurate and complete.    Clinical Impression:  Final diagnoses:  [F41.9] Anxiety (Primary)          ED Disposition Condition    Discharge Stable          ED Prescriptions       Medication Sig Dispense Start Date End Date Auth. Provider    hydrOXYzine HCL (ATARAX) 25 MG tablet Take 1 tablet (25 mg total) by mouth 3 (three) times daily as needed for Anxiety. 12 tablet 6/23/2025 -- Marily Allred NP    ondansetron (ZOFRAN-ODT) 4 MG TbDL Take 1 tablet (4 mg total) by mouth every 6 (six) hours as needed (nausea). 10 tablet 6/23/2025 -- Marily Allred NP          Follow-up Information       Follow up With Specialties Details Why Contact [facility] -  Schedule an appointment as soon as possible for a visit in 1 day For follow-up if you do not have a primary care doctor [address] [place]  [address]  [phone]      [facility] ED Emergency Medicine Go to  If symptoms worsen [address]  [facility] [address] [phone]                   [1]   Social History  Tobacco Use    Smoking status: Never    Smokeless tobacco: Never   Substance Use Topics    Alcohol use: Yes     Comment: RARELY    Drug use: No        Marily Allred NP  06/23/25 4154

## 2025-06-23 NOTE — Clinical Note
"Forrest Gardnerua" Glenn was seen and treated in our emergency department on 6/23/2025.  He may return to work on 06/25/2025.  Please excuse for 4/24 and 4/25/25     If you have any questions or concerns, please don't hesitate to call.      CHRISTINA Altamirano RN    "

## 2025-06-23 NOTE — DISCHARGE INSTRUCTIONS
Thank you for coming to our Emergency Department today. It is important to remember that some problems or medical conditions are difficult to diagnose and may not be found during your Emergency Department visit.     Be sure to follow up with your primary care doctor and review all labs/imaging/tests that were performed during your ER visit with them. Some labs/tests may be outside of the normal range and require non-emergent follow-up and further investigation to help diagnose/exclude/prevent complications or other potentially serious medical conditions that were not addressed during your ER visit.    If you do not have a primary care doctor, you may contact the one listed on your discharge paperwork or you may also call the [facility] Appointment Desk at [phone] to schedule an appointment and establish care with one. It is important to your health that you have a primary care doctor.    Please take all medications as directed. All medications may potentially have side-effects and it is impossible to predict which medications may give you side-effects or what side-effects (if any) they will give you.. If you feel that you are having a negative effect or side-effect of any medication you should immediately stop taking them and seek medical attention. If you feel that you are having a life-threatening reaction call 911.    Return to the ER with any questions/concerns, new/concerning symptoms, worsening or failure to improve.     Do not drive, swim, climb to height, take a bath, operate heavy machinery, drink alcohol or take potentially sedating medications, sign any legal documents or make any important decisions for 24 hours if you have received any pain medications, sedatives or mood altering drugs during your ER visit or within 24 hours of taking them if they have been prescribed to you.     You can find additional resources for Dentists, hearing aids, durable medical equipment, low cost pharmacies and  other resources at https://[facility].org    BELOW THIS LINE ONLY APPLIES IF YOU HAVE A COVID TEST PENDING OR IF YOU HAVE BEEN DIAGNOSED WITH COVID:  Please access [facility] to review the results of your test. Until the results of your COVID test return, you should isolate yourself so as not to potentially spread illness to others.   If your COVID test returns positive, you should isolate yourself so as not to spread illness to others. After five full days, if you are feeling better and you have not had fever for 24 hours, you can return to your typical daily activities, but you must wear a mask around others for an additional 5 days.   If your COVID test returns negative and you are either unvaccinated or more than six months out from your two-dose vaccine and are not yet boosted, you should still quarantine for 5 full days followed by strict mask use for an additional 5 full days.   If your COVID test returns negative and you have received your 2-dose initial vaccine as well as a booster, you should continue strict mask use for 10 full days after the exposure.  For all those exposed, best practice includes a test at day 5 after the exposure. This can be a home test or a test through one of the many testing centers throughout our community.   Masking is always advised to limit the spread of COVID. Cdc.gov is an excellent site to obtain the latest up to date recommendations regarding COVID and COVID testing.     CDC Testing and Quarantine Guidelines for patients with exposure to a known-positive COVID-19 person:  A close exposure is defined as anyone who has had an exposure (masked or unmasked) to a known COVID -19 positive person within 6 feet of someone for a cumulative total of 15 minutes or more over a 24-hour period.   Vaccinated and/or if you recently had a positive covid test within 90 days do NOT need to quarantine after contact with someone who had COVID-19 unless you develop symptoms.   Fully vaccinated  people who have not had a positive test within 90 days, should get tested 3-5 days after their exposure, even if they don't have symptoms and wear a mask indoors in public for 14 days following exposure or until their test result is negative.      Unvaccinated and/or NOT had a positive test within 90 days and meet close exposure  You are required by CDC guidelines to quarantine for at least 5 days from time of exposure followed by 5 days of strict masking. It is recommended, but not required to test after 5 days, unless you develop symptoms, in which case you should test at that time.  If you get tested after 5 days and your test is positive, your 5 day period of isolation starts the day of the positive test.    If your exposure does not meet the above definition, you can return to your normal daily activities to include social distancing, wearing a mask and frequent handwashing.      Here is a link to guidance from the CDC:  https://www.cdc.gov/media/releases/2021/s1227-isolation-quarantine-guidance.html      [place] Dept Of Health Testing Sites:  https://ldh.la.gov/page/3934      [facility] website with testing locations and guidance:  https://[email or web address]

## 2025-06-23 NOTE — Clinical Note
"Forrest Kennedy" Glenn was seen and treated in our emergency department on 6/23/2025.  He may return to work on 06/25/2025.       If you have any questions or concerns, please don't hesitate to call.      Madneep VASQUEZ RN    "